# Patient Record
Sex: MALE | Employment: FULL TIME | URBAN - METROPOLITAN AREA
[De-identification: names, ages, dates, MRNs, and addresses within clinical notes are randomized per-mention and may not be internally consistent; named-entity substitution may affect disease eponyms.]

---

## 2021-04-05 ENCOUNTER — THERAPY VISIT (OUTPATIENT)
Dept: PHYSICAL THERAPY | Facility: CLINIC | Age: 36
End: 2021-04-05
Payer: COMMERCIAL

## 2021-04-05 DIAGNOSIS — M25.561 ACUTE PAIN OF RIGHT KNEE: ICD-10-CM

## 2021-04-05 PROCEDURE — 97110 THERAPEUTIC EXERCISES: CPT | Mod: GP | Performed by: PHYSICAL THERAPIST

## 2021-04-05 PROCEDURE — 97161 PT EVAL LOW COMPLEX 20 MIN: CPT | Mod: GP | Performed by: PHYSICAL THERAPIST

## 2021-04-05 ASSESSMENT — ACTIVITIES OF DAILY LIVING (ADL)
GIVING WAY, BUCKLING OR SHIFTING OF KNEE: I DO NOT HAVE THE SYMPTOM
AS_A_RESULT_OF_YOUR_KNEE_INJURY,_HOW_WOULD_YOU_RATE_YOUR_CURRENT_LEVEL_OF_DAILY_ACTIVITY?: ABNORMAL
WALK: ACTIVITY IS SOMEWHAT DIFFICULT
SQUAT: I AM UNABLE TO DO THE ACTIVITY
SWELLING: THE SYMPTOM AFFECTS MY ACTIVITY SLIGHTLY
KNEEL ON THE FRONT OF YOUR KNEE: I AM UNABLE TO DO THE ACTIVITY
RISE FROM A CHAIR: ACTIVITY IS SOMEWHAT DIFFICULT
LIMPING: THE SYMPTOM AFFECTS MY ACTIVITY SEVERELY
RAW_SCORE: 29
PAIN: THE SYMPTOM AFFECTS MY ACTIVITY MODERATELY
SIT WITH YOUR KNEE BENT: I AM UNABLE TO DO THE ACTIVITY
STAND: ACTIVITY IS MINIMALLY DIFFICULT
GO DOWN STAIRS: ACTIVITY IS FAIRLY DIFFICULT
KNEE_ACTIVITY_OF_DAILY_LIVING_SCORE: 41.43
STIFFNESS: THE SYMPTOM AFFECTS MY ACTIVITY SEVERELY
HOW_WOULD_YOU_RATE_THE_CURRENT_FUNCTION_OF_YOUR_KNEE_DURING_YOUR_USUAL_DAILY_ACTIVITIES_ON_A_SCALE_FROM_0_TO_100_WITH_100_BEING_YOUR_LEVEL_OF_KNEE_FUNCTION_PRIOR_TO_YOUR_INJURY_AND_0_BEING_THE_INABILITY_TO_PERFORM_ANY_OF_YOUR_USUAL_DAILY_ACTIVITIES?: 20
KNEE_ACTIVITY_OF_DAILY_LIVING_SUM: 29
WEAKNESS: THE SYMPTOM AFFECTS MY ACTIVITY SLIGHTLY
HOW_WOULD_YOU_RATE_THE_OVERALL_FUNCTION_OF_YOUR_KNEE_DURING_YOUR_USUAL_DAILY_ACTIVITIES?: ABNORMAL
GO UP STAIRS: ACTIVITY IS FAIRLY DIFFICULT

## 2021-04-05 NOTE — PROGRESS NOTES
Physical Therapy Initial Evaluation  Subjective:  The history is provided by the patient. No  was used.   Patient Health History  Braxton Fairbanks being seen for R Knee Strain.     Date of Onset: 3/14/21.   Problem occurred: Snowboard Fall   Pain score: ranges 0-8/10.  General health as reported by patient is good.  Pertinent medical history includes: high blood pressure. Other medical history details: Kidney disease.   Red flags:  None as reported by patient.  Medical allergies: none.    Other surgery history details: Abdominal Hernia.    Current medications:  Anti-inflammatory and high blood pressure medication.    Current occupation is HVAC TECH.   Primary job tasks include:  Computer work, lifting/carrying and prolonged sitting.                  Therapist Generated HPI Evaluation  Problem details: MRI 3/18/21:  IMPRESSION: Injury left/strain of the MCL; normal coursing fibers are identified.    Injury of the femoral attachment of the medial patellar retinaculum.    Bone contusion of the posterior most aspect of lateral femoral condyle.    Moderate size joint effusion.    Mild patellar tendon lateral femoral condyle impingement syndrome.    REPORT SIGNED BY DR. Mattie Hewitt      .         Type of problem:  Left knee.    This is a new condition.  Condition occurred with:  A fall/slip.  Where condition occurred: during recreation/sport (Snowboard).  Patient reports pain:  Medial.  Pain is described as sharp and is intermittent.  Pain is the same all the time.  Since onset symptoms are unchanged.  Associated symptoms:  Loss of motion/stiffness. Symptoms are exacerbated by bending/squatting, ascending stairs, descending stairs and kneeling (Bending Knee)  and relieved by rest, ice and NSAID's.  Special tests included:  MRI.    Restrictions due to condition include:  Working in normal job without restrictions.  Barriers include:  None as reported by patient.                         Objective:  Standing Alignment:                Ankle/Foot:  Pes planus L and pes planus R    Gait:    Gait Type:  Antalgic   Assistive Devices:  None    Non-Weight Bearing:        Knee:  Normal    Flexibility/Screens:   Positive screens:  Knee    Lower Extremity:      Decreased right lower extremity flexibility:  Quadriceps; Hamstrings; Gastroc and Soleus                                                      Knee Evaluation:  ROM:  Strength wnl knee: Fair Quad Set Right. Good strength w/o pain on resisted right knee ext and flexion.    PROM    Hyperextension: Left: 5   Right:   Extension: Left:   Right:  Lacking 5 degrees of extension   Flexion: Left: 140    Right:  65        Ligament Testing:    Varus 0:  Right:  Pos    Valgus 0:  Right:  Pos          Special Tests:       Right knee negative for the following special tests:  Meniscal and Patellar Compression  Palpation:      Right knee tenderness present at:  Medial Joint Line  Right knee tenderness not present at:  Lateral Joint Line; Patellar Tendon; Popliteal; Patellar Medial; Patellar Lateral; Patellar Superior and Patellar Inferior  Edema:  Normal    Mobility Testing:  Not Assessed            Functional Testing:  not assessed                  General     ROS    Assessment/Plan:    Patient is a 36 year old male with left side knee complaints.    Patient has the following significant findings with corresponding treatment plan.                Diagnosis 1:  L MCL Strain  Pain -  hot/cold therapy, manual therapy, self management, education and home program  Decreased ROM/flexibility - manual therapy, therapeutic exercise and home program  Decreased strength - therapeutic exercise, therapeutic activities and home program  Inflammation - cold therapy and self management/home program  Impaired gait - gait training and home program  Impaired muscle performance - neuro re-education and home program  Decreased function - therapeutic activities and home  program    Therapy Evaluation Codes:   1) History comprised of:   Personal factors that impact the plan of care:      None.    Comorbidity factors that impact the plan of care are:      High blood pressure and Kidney Disease.     Medications impacting care: Anti-inflammatory and High blood pressure.  2) Examination of Body Systems comprised of:   Body structures and functions that impact the plan of care:      Knee.   Activity limitations that impact the plan of care are:      Bending, Driving, Sports, Squatting/kneeling, Stairs and Walking.  3) Clinical presentation characteristics are:   Stable/Uncomplicated.  4) Decision-Making    Low complexity using standardized patient assessment instrument and/or measureable assessment of functional outcome.  Cumulative Therapy Evaluation is: Low complexity.    Previous and current functional limitations:  (See Goal Flow Sheet for this information)    Short term and Long term goals: (See Goal Flow Sheet for this information)     Communication ability:  Patient appears to be able to clearly communicate and understand verbal and written communication and follow directions correctly.  Treatment Explanation - The following has been discussed with the patient:   RX ordered/plan of care  Anticipated outcomes  Possible risks and side effects  This patient would benefit from PT intervention to resume normal activities.   Rehab potential is good.    Frequency:  2 X week, once daily  Duration:  for 3 weeks  Discharge Plan:  Achieve all LTG.  Independent in home treatment program.  Return to previous functional level by discharge.  Reach maximal therapeutic benefit.    Please refer to the daily flowsheet for treatment today, total treatment time and time spent performing 1:1 timed codes.

## 2021-04-07 ENCOUNTER — THERAPY VISIT (OUTPATIENT)
Dept: PHYSICAL THERAPY | Facility: CLINIC | Age: 36
End: 2021-04-07
Payer: COMMERCIAL

## 2021-04-07 DIAGNOSIS — M25.561 ACUTE PAIN OF RIGHT KNEE: ICD-10-CM

## 2021-04-07 PROCEDURE — 97110 THERAPEUTIC EXERCISES: CPT | Mod: GP | Performed by: PHYSICAL THERAPY ASSISTANT

## 2021-04-12 ENCOUNTER — THERAPY VISIT (OUTPATIENT)
Dept: PHYSICAL THERAPY | Facility: CLINIC | Age: 36
End: 2021-04-12
Payer: COMMERCIAL

## 2021-04-12 DIAGNOSIS — M25.561 ACUTE PAIN OF RIGHT KNEE: ICD-10-CM

## 2021-04-12 PROCEDURE — 97112 NEUROMUSCULAR REEDUCATION: CPT | Mod: GP | Performed by: PHYSICAL THERAPIST

## 2021-04-12 PROCEDURE — 97530 THERAPEUTIC ACTIVITIES: CPT | Mod: GP | Performed by: PHYSICAL THERAPIST

## 2021-04-12 PROCEDURE — 97110 THERAPEUTIC EXERCISES: CPT | Mod: GP | Performed by: PHYSICAL THERAPIST

## 2021-04-15 ENCOUNTER — THERAPY VISIT (OUTPATIENT)
Dept: PHYSICAL THERAPY | Facility: CLINIC | Age: 36
End: 2021-04-15
Payer: COMMERCIAL

## 2021-04-15 DIAGNOSIS — M25.561 ACUTE PAIN OF RIGHT KNEE: ICD-10-CM

## 2021-04-15 PROCEDURE — 97530 THERAPEUTIC ACTIVITIES: CPT | Mod: GP | Performed by: PHYSICAL THERAPIST

## 2021-04-15 PROCEDURE — 97112 NEUROMUSCULAR REEDUCATION: CPT | Mod: GP | Performed by: PHYSICAL THERAPIST

## 2021-04-15 PROCEDURE — 97110 THERAPEUTIC EXERCISES: CPT | Mod: GP | Performed by: PHYSICAL THERAPIST

## 2021-04-18 ENCOUNTER — HEALTH MAINTENANCE LETTER (OUTPATIENT)
Age: 36
End: 2021-04-18

## 2021-04-19 ENCOUNTER — THERAPY VISIT (OUTPATIENT)
Dept: PHYSICAL THERAPY | Facility: CLINIC | Age: 36
End: 2021-04-19
Payer: COMMERCIAL

## 2021-04-19 DIAGNOSIS — M25.561 ACUTE PAIN OF RIGHT KNEE: ICD-10-CM

## 2021-04-19 PROCEDURE — 97530 THERAPEUTIC ACTIVITIES: CPT | Mod: GP | Performed by: PHYSICAL THERAPIST

## 2021-04-19 PROCEDURE — 97110 THERAPEUTIC EXERCISES: CPT | Mod: GP | Performed by: PHYSICAL THERAPIST

## 2021-04-19 PROCEDURE — 97112 NEUROMUSCULAR REEDUCATION: CPT | Mod: GP | Performed by: PHYSICAL THERAPIST

## 2021-04-22 ENCOUNTER — THERAPY VISIT (OUTPATIENT)
Dept: PHYSICAL THERAPY | Facility: CLINIC | Age: 36
End: 2021-04-22
Payer: COMMERCIAL

## 2021-04-22 DIAGNOSIS — M25.561 ACUTE PAIN OF RIGHT KNEE: ICD-10-CM

## 2021-04-22 PROCEDURE — 97110 THERAPEUTIC EXERCISES: CPT | Mod: GP | Performed by: PHYSICAL THERAPIST

## 2021-04-22 PROCEDURE — 97112 NEUROMUSCULAR REEDUCATION: CPT | Mod: GP | Performed by: PHYSICAL THERAPIST

## 2021-04-22 PROCEDURE — 97530 THERAPEUTIC ACTIVITIES: CPT | Mod: GP | Performed by: PHYSICAL THERAPIST

## 2021-04-22 ASSESSMENT — ACTIVITIES OF DAILY LIVING (ADL)
SWELLING: I HAVE THE SYMPTOM BUT IT DOES NOT AFFECT MY ACTIVITY
KNEE_ACTIVITY_OF_DAILY_LIVING_SUM: 47
HOW_WOULD_YOU_RATE_THE_OVERALL_FUNCTION_OF_YOUR_KNEE_DURING_YOUR_USUAL_DAILY_ACTIVITIES?: NEARLY NORMAL
STAND: ACTIVITY IS NOT DIFFICULT
RAW_SCORE: 47
RISE FROM A CHAIR: ACTIVITY IS SOMEWHAT DIFFICULT
WALK: ACTIVITY IS MINIMALLY DIFFICULT
LIMPING: THE SYMPTOM AFFECTS MY ACTIVITY SLIGHTLY
STIFFNESS: THE SYMPTOM AFFECTS MY ACTIVITY MODERATELY
SQUAT: ACTIVITY IS SOMEWHAT DIFFICULT
GO UP STAIRS: ACTIVITY IS MINIMALLY DIFFICULT
PAIN: I HAVE THE SYMPTOM BUT IT DOES NOT AFFECT MY ACTIVITY
WEAKNESS: I HAVE THE SYMPTOM BUT IT DOES NOT AFFECT MY ACTIVITY
KNEE_ACTIVITY_OF_DAILY_LIVING_SCORE: 67.14
KNEEL ON THE FRONT OF YOUR KNEE: ACTIVITY IS VERY DIFFICULT
HOW_WOULD_YOU_RATE_THE_CURRENT_FUNCTION_OF_YOUR_KNEE_DURING_YOUR_USUAL_DAILY_ACTIVITIES_ON_A_SCALE_FROM_0_TO_100_WITH_100_BEING_YOUR_LEVEL_OF_KNEE_FUNCTION_PRIOR_TO_YOUR_INJURY_AND_0_BEING_THE_INABILITY_TO_PERFORM_ANY_OF_YOUR_USUAL_DAILY_ACTIVITIES?: 80
SIT WITH YOUR KNEE BENT: ACTIVITY IS SOMEWHAT DIFFICULT
GO DOWN STAIRS: ACTIVITY IS FAIRLY DIFFICULT
AS_A_RESULT_OF_YOUR_KNEE_INJURY,_HOW_WOULD_YOU_RATE_YOUR_CURRENT_LEVEL_OF_DAILY_ACTIVITY?: NEARLY NORMAL
GIVING WAY, BUCKLING OR SHIFTING OF KNEE: I DO NOT HAVE THE SYMPTOM

## 2021-04-22 NOTE — PROGRESS NOTES
Subjective:  HPI  Physical Exam       Knee Activity of Daily Living Score: 67.14            Objective:  System    Physical Exam    General     ROS    Assessment/Plan:    PROGRESS  REPORT    Progress reporting period is from 4-5-21 to 4-22-21.       SUBJECTIVE  Subjective changes noted by patient:  Braxton feels encouraged by his improvement each week. However, he does not yet have full confidence in the right knee. He is not yet able to kneel with ease. Cheif c/o stiffness and weakness.     Current Pain level: 0-1/10.     Initial Pain level: 6/10.   Changes in function:  Yes (See Goal flowsheet attached for changes in current functional level)  Adverse reaction to treatment or activity: None    OBJECTIVE  Changes noted in objective findings:  Yes,   Objective: Improved gait with verbal clues. R LE lags through follow-through. PROM = 0-0-132 degrees.      ASSESSMENT/PLAN  Updated problem list and treatment plan: Diagnosis 1:  R MCL Strain  Pain -  hot/cold therapy, manual therapy, self management, education and home program  Decreased ROM/flexibility - manual therapy, therapeutic exercise and home program  Decreased strength - therapeutic exercise, therapeutic activities and home program  Inflammation - self management/home program  Impaired gait - gait training and home program  Impaired muscle performance - neuro re-education and home program  Decreased function - therapeutic activities and home program  STG/LTGs have been met or progress has been made towards goals:  Yes (See Goal flow sheet completed today.)  Assessment of Progress: The patient's condition is improving.  Patient is meeting short term goals and is progressing towards long term goals.  Self Management Plans:  Patient has been instructed in a home treatment program.  Patient  has been instructed in self management of symptoms.  I have re-evaluated this patient and find that the nature, scope, duration and intensity of the therapy is appropriate for the  medical condition of the patient.  Braxton continues to require the following intervention to meet STG and LTG's:  PT    Recommendations:  This patient would benefit from continued therapy.     Frequency:  1 X week, once daily  Duration:  for 4 weeks        Please refer to the daily flowsheet for treatment today, total treatment time and time spent performing 1:1 timed codes.

## 2021-04-22 NOTE — LETTER
[facility]  [address]  [place] [address]  [phone]    2021  Re: Braxton Fairbanks   :   1985  MRN:  [id number]   REFERRING PHYSICIAN:   [facility]AC[facility]    Date of Initial Evaluation:  2021  Visits:  Rxs Used: 6  Reason for Referral:  Acute pain of right knee    Physical Exam       Knee Activity of Daily Living Score: 67.14            Assessment/Plan:    PROGRESS  REPORT  Progress reporting period is from 21 to 21.     SUBJECTIVE  Subjective changes noted by patient:  Braxton feels encouraged by his improvement each week. However, he does not yet have full confidence in the right knee. He is not yet able to kneel with ease. Cheif c/o stiffness and weakness.     Current Pain level: 0-1/10.     Initial Pain level: 6/10.   Changes in function:  Yes (See Goal flowsheet attached for changes in current functional level)  Adverse reaction to treatment or activity: None  OBJECTIVE  Changes noted in objective findings:  Yes,   Objective: Improved gait with verbal clues. R LE lags through follow-through. PROM = 0-0-132 degrees.    ASSESSMENT/PLAN  Updated problem list and treatment plan: Diagnosis 1:  R MCL Strain  Pain -  hot/cold therapy, manual therapy, self management, education and home program  Decreased ROM/flexibility - manual therapy, therapeutic exercise and home program  Decreased strength - therapeutic exercise, therapeutic activities and home program  Inflammation - self management/home program  Impaired gait - gait training and home program  Impaired muscle performance - neuro re-education and home program  Decreased function - therapeutic activities and home program  STG/LTGs have been met or progress has been made towards goals:  Yes (See Goal flow sheet completed today.)  Assessment of Progress: The patient's condition is improving.  Patient is meeting short term  goals and is progressing towards long term goals.  Self Management Plans:  Patient has been instructed in a home treatment program.  Patient  has been instructed in self management of symptoms.  I have re-evaluated this patient and find that the nature, scope, duration and intensity of the therapy is appropriate for the medical condition of the patient.  Braxton continues to require the following intervention to meet STG and LTG's:  PT  Re: Braxton FORD Magdi   :   1985, page 2    Recommendations:  This patient would benefit from continued therapy.     Frequency:  1 X week, once daily  Duration:  for 4 weeks        Thank you for your referral.    INQUIRIES  Therapist: DONNA Kim (Peg) [address]  [facility] [address]  Phone: [phone]  Fax: [phone]

## 2021-05-04 ENCOUNTER — THERAPY VISIT (OUTPATIENT)
Dept: PHYSICAL THERAPY | Facility: CLINIC | Age: 36
End: 2021-05-04
Payer: COMMERCIAL

## 2021-05-04 DIAGNOSIS — M25.561 ACUTE PAIN OF RIGHT KNEE: ICD-10-CM

## 2021-05-04 PROCEDURE — 97530 THERAPEUTIC ACTIVITIES: CPT | Mod: GP | Performed by: PHYSICAL THERAPIST

## 2021-05-04 PROCEDURE — 97110 THERAPEUTIC EXERCISES: CPT | Mod: GP | Performed by: PHYSICAL THERAPIST

## 2021-05-04 PROCEDURE — 97112 NEUROMUSCULAR REEDUCATION: CPT | Mod: GP | Performed by: PHYSICAL THERAPIST

## 2021-05-04 ASSESSMENT — ACTIVITIES OF DAILY LIVING (ADL)
SQUAT: ACTIVITY IS NOT DIFFICULT
GO DOWN STAIRS: ACTIVITY IS NOT DIFFICULT
STAND: ACTIVITY IS NOT DIFFICULT
GO UP STAIRS: ACTIVITY IS NOT DIFFICULT
SIT WITH YOUR KNEE BENT: ACTIVITY IS NOT DIFFICULT
KNEE_ACTIVITY_OF_DAILY_LIVING_SUM: 70
STIFFNESS: I DO NOT HAVE THE SYMPTOM
KNEEL ON THE FRONT OF YOUR KNEE: ACTIVITY IS NOT DIFFICULT
AS_A_RESULT_OF_YOUR_KNEE_INJURY,_HOW_WOULD_YOU_RATE_YOUR_CURRENT_LEVEL_OF_DAILY_ACTIVITY?: NEARLY NORMAL
LIMPING: I DO NOT HAVE THE SYMPTOM
HOW_WOULD_YOU_RATE_THE_OVERALL_FUNCTION_OF_YOUR_KNEE_DURING_YOUR_USUAL_DAILY_ACTIVITIES?: NEARLY NORMAL
WEAKNESS: I DO NOT HAVE THE SYMPTOM
HOW_WOULD_YOU_RATE_THE_CURRENT_FUNCTION_OF_YOUR_KNEE_DURING_YOUR_USUAL_DAILY_ACTIVITIES_ON_A_SCALE_FROM_0_TO_100_WITH_100_BEING_YOUR_LEVEL_OF_KNEE_FUNCTION_PRIOR_TO_YOUR_INJURY_AND_0_BEING_THE_INABILITY_TO_PERFORM_ANY_OF_YOUR_USUAL_DAILY_ACTIVITIES?: 95
SWELLING: I DO NOT HAVE THE SYMPTOM
RISE FROM A CHAIR: ACTIVITY IS NOT DIFFICULT
KNEE_ACTIVITY_OF_DAILY_LIVING_SCORE: 100
GIVING WAY, BUCKLING OR SHIFTING OF KNEE: I DO NOT HAVE THE SYMPTOM
PAIN: I DO NOT HAVE THE SYMPTOM
RAW_SCORE: 70
WALK: ACTIVITY IS NOT DIFFICULT

## 2021-05-04 NOTE — LETTER
[facility]  [address]  [facility] [address] [phone]    May 5, 2021    Re: Braxton Fairbanks   :   1985  MRN:  [id number]   REFERRING PHYSICIAN:   Migel FORD [facility]    Date of Initial Evaluation:  2021  Visits:  Rxs Used: 7  Reason for Referral:  Acute pain of right knee    EVALUATION SUMMARY    Subjective:  HPI  Physical Exam       Knee Activity of Daily Living Score: 100            Objective:  System    Physical Exam    General     ROS    Assessment/Plan:    DISCHARGE REPORT    Progress reporting period is from 21 to 21.       SUBJECTIVE  Subjective changes noted by patient:       Braxton's cheif c/o mild stiffness in AM. Would like to resume his previous level of activity.    Current Pain level: 0/10.     Initial Pain level: 6/10.   Changes in function:  Yes (See Goal flowsheet attached for changes in current functional level)  Adverse reaction to treatment or activity: None    OBJECTIVE  Changes noted in objective findings:       Passive Right Knee ROM = 3-0-140 degrees. Ambulates on level surfaces and stairs in normal gait pattern w/o pain.          Re: Braxton Fairbanks   :   1985    ASSESSMENT/PLAN  Updated problem list and treatment plan: Diagnosis 1:  R MCL Strain  Pain -  hot/cold therapy, self management, education and home program  Decreased ROM/flexibility - manual therapy, therapeutic exercise and home program  Decreased strength - therapeutic exercise, therapeutic activities and home program  Inflammation - self management/home program  Impaired gait - home program  Impaired muscle performance - neuro re-education and home program  STG/LTGs have been met or progress has been made towards goals:  Yes (See Goal flow sheet completed today.)  Assessment of Progress: The patient's condition is improving.  The patient has met all of their long term goals.  Self Management  Plans:  Patient is independent in a home treatment program.  Patient is independent in self management of symptoms.  I have re-evaluated this patient and find that the nature, scope, duration and intensity of the therapy is appropriate for the medical condition of the patient.  Braxton continues to require the following intervention to meet STG and LTG's:  PT intervention is no longer required to meet STG/LTG.    Recommendations:  This patient is ready to be discharged from therapy and continue their home treatment program.    Thank you for your referral.    INQUIRIES  Therapist: Janiya Bolton PT (Peg)  [address]  [facility] [address]  Phone: [phone]  Fax: [phone]

## 2021-05-04 NOTE — PROGRESS NOTES
Subjective:  HPI  Physical Exam       Knee Activity of Daily Living Score: 100            Objective:  System    Physical Exam    General     ROS    Assessment/Plan:    DISCHARGE REPORT    Progress reporting period is from 4-5-21 to 5-4-21.       SUBJECTIVE  Subjective changes noted by patient:       Braxton's cheif c/o mild stiffness in AM. Would like to resume his previous level of activity.    Current Pain level: 0/10.     Initial Pain level: 6/10.   Changes in function:  Yes (See Goal flowsheet attached for changes in current functional level)  Adverse reaction to treatment or activity: None    OBJECTIVE  Changes noted in objective findings:       Passive Right Knee ROM = 3-0-140 degrees. Ambulates on level surfaces and stairs in normal gait pattern w/o pain.      ASSESSMENT/PLAN  Updated problem list and treatment plan: Diagnosis 1:  R MCL Strain  Pain -  hot/cold therapy, self management, education and home program  Decreased ROM/flexibility - manual therapy, therapeutic exercise and home program  Decreased strength - therapeutic exercise, therapeutic activities and home program  Inflammation - self management/home program  Impaired gait - home program  Impaired muscle performance - neuro re-education and home program  STG/LTGs have been met or progress has been made towards goals:  Yes (See Goal flow sheet completed today.)  Assessment of Progress: The patient's condition is improving.  The patient has met all of their long term goals.  Self Management Plans:  Patient is independent in a home treatment program.  Patient is independent in self management of symptoms.  I have re-evaluated this patient and find that the nature, scope, duration and intensity of the therapy is appropriate for the medical condition of the patient.  Braxton continues to require the following intervention to meet STG and LTG's:  PT intervention is no longer required to meet STG/LTG.    Recommendations:  This patient is ready to be  discharged from therapy and continue their home treatment program.    Please refer to the daily flowsheet for treatment today, total treatment time and time spent performing 1:1 timed codes.

## 2021-10-03 ENCOUNTER — HEALTH MAINTENANCE LETTER (OUTPATIENT)
Age: 36
End: 2021-10-03

## 2022-05-15 ENCOUNTER — HEALTH MAINTENANCE LETTER (OUTPATIENT)
Age: 37
End: 2022-05-15

## 2022-05-16 ENCOUNTER — PATIENT OUTREACH (OUTPATIENT)
Dept: ONCOLOGY | Facility: CLINIC | Age: 37
End: 2022-05-16
Payer: COMMERCIAL

## 2022-05-16 ENCOUNTER — DOCUMENTATION ONLY (OUTPATIENT)
Dept: ONCOLOGY | Facility: CLINIC | Age: 37
End: 2022-05-16
Payer: COMMERCIAL

## 2022-05-16 ENCOUNTER — TRANSCRIBE ORDERS (OUTPATIENT)
Dept: ONCOLOGY | Facility: CLINIC | Age: 37
End: 2022-05-16
Payer: COMMERCIAL

## 2022-05-16 DIAGNOSIS — C71.9 GBM (GLIOBLASTOMA MULTIFORME) (H): Primary | ICD-10-CM

## 2022-05-16 NOTE — PROGRESS NOTES
I received a referral for this patient to be seen by medical oncology.  I called his sister, Floridalma Fairbanks, explained the purpose of my call, explained my role and worked to find an appointment that worked for them.  Scheduling instructions given and referral sent to NPS (new patient scheduling) team to be finalized.  Questions answered.  My contact information was shared with patient and explained to them that I am their contact until they meet with medical oncology and then their Oncologist's RNCC (RN Care Coordinator) will be their contact.    New Patient Oncology Nurse Navigator Note     Referring provider: self referral     Referred to (specialty): Neuro Medical Oncology    Requested provider (if applicable): Dr. Fely Andrea     Date Referral Received: 5/16/2022     Evaluation for : GBM     Clinical History (per Nurse review of records provided):    **BOOK MARKED**  NOTES:  5/9/2022:  [facility]  5/6/2022:  Admission to [facility]  5/6/2022:  [facility] ED visit    IMAGING:  **multiple images at [facility]**      PATHOLOGY:  5/9/2022  Amendment  This report is amended to add the result of BRAF V600E immunohistochemistry: it is negative.     Although IDH-mutant gliomas show nearly universal MGMT promoter methylation, certain clinical trials require MGMT promoter methylation testing for enrollment; following discussion of this case with Dr. Stephen, this testing is forwarded. Results will be reported by further amendment.   Final Diagnosis  A) BRAIN, LEFT FRONTAL TUMOR, RESECTION:   1. 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 IDH1 R132H immunostain positive   3. ATRX immunostain with lost immunoreactivity   4. Immunostain for p53 strongly positive    Amendment electronically signed by Siddhartha Jacobs MD on 5/12/2022 at  8:31 AM Electronically signed by Siddhartha Jacobs MD on 5/10/2022 at  4:47 PM   Comment  This infiltrating  "astrocytoma would have been classified as glioblastoma, IDH-mutant in the 2016 WHO classification (1). The term \"astrocytoma, IDH-mutant, grade 4\" reflects the observation that these tumors tend to behave somewhat better than IDH-wildtype glioblastomas (2), although both are retained in the CNS WHO grade 4            Clinical Assessment / Barriers to Care (Per Nurse): Getting opinions at [facility]       Records Location (Care Everywhere, Media, etc.): [facility] ([place], [place])     Records Needed: imaging PUSHED FROM [facility]     Additional testing needed prior to consult: none      " Alert and oriented to person, place and time

## 2022-05-16 NOTE — PROGRESS NOTES
Action May 16, 2022 11:36 AM ABT   Action Taken Called [facility] Film Room and spoke to Benjamin, confirms that both MR Head/Brain from 05/8/22 & 05/09/22 will be pushed to PACS.    Patient's sister gave auth to LUPILLO aD Silva to pull [facility] recs.    Recs updated in [facility]. Called and spoke to [place] at [facility]ology and request for 05/06/22 MR Brain and CT Head. Mali confirms images will be pushed to PACS    11:52 AM ABT  Images from [facility] and [facility] received and resolved to PACS

## 2022-05-16 NOTE — PROGRESS NOTES
RECORDS STATUS - ALL OTHER DIAGNOSIS      RECORDS RECEIVED FROM: [facility]   DATE RECEIVED: 5/17/2022   NOTES STATUS DETAILS   OFFICE NOTE from referring provider Self  Self Referral    DISCHARGE SUMMARY from hospital     DISCHARGE REPORT from the ER     OPERATIVE REPORT Complete See Brain Biopsy in [facility]   CLINICAL TRIAL TREATMENTS TO DATE     LABS     PATHOLOGY REPORTS Requested- [facility]ach[facility] Tracking Number:  [id number] 5/9/2022 in    Case: [id number]  A) BRAIN, LEFT FRONTAL TUMOR, RESECTION:   1. 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 IDH1 R132H immunostain positive   3. ATRX immunostain with lost immunoreactivity   4. Immunostain for p53 strongly positive    ANYTHING RELATED TO DIAGNOSIS Complete Labs last updated on 5/12/2022 in    GENONOMIC TESTING     TYPE:     IMAGING (NEED IMAGES & REPORT)     CT SCANS Complete CT Head 5/6/2022   MRI Complete MRI Brain 5/9/2022, 5/8/2022, 5/6/2022   MAMMO     ULTRASOUND     PET

## 2022-05-17 ENCOUNTER — PRE VISIT (OUTPATIENT)
Dept: ONCOLOGY | Facility: CLINIC | Age: 37
End: 2022-05-17

## 2022-05-17 ENCOUNTER — ONCOLOGY VISIT (OUTPATIENT)
Dept: ONCOLOGY | Facility: CLINIC | Age: 37
End: 2022-05-17
Attending: PSYCHIATRY & NEUROLOGY
Payer: COMMERCIAL

## 2022-05-17 ENCOUNTER — PATIENT OUTREACH (OUTPATIENT)
Dept: ONCOLOGY | Facility: CLINIC | Age: 37
End: 2022-05-17
Payer: COMMERCIAL

## 2022-05-17 VITALS
SYSTOLIC BLOOD PRESSURE: 148 MMHG | HEART RATE: 79 BPM | HEIGHT: 70 IN | WEIGHT: 213 LBS | BODY MASS INDEX: 30.49 KG/M2 | OXYGEN SATURATION: 98 % | TEMPERATURE: 98 F | RESPIRATION RATE: 20 BRPM | DIASTOLIC BLOOD PRESSURE: 89 MMHG

## 2022-05-17 DIAGNOSIS — C71.9 GRADE 4 ASTROCYTOMA (H): Primary | ICD-10-CM

## 2022-05-17 PROCEDURE — 99205 OFFICE O/P NEW HI 60 MIN: CPT | Performed by: PSYCHIATRY & NEUROLOGY

## 2022-05-17 RX ORDER — LEVETIRACETAM 500 MG/1
500 TABLET ORAL
COMMUNITY
Start: 2022-05-12

## 2022-05-17 RX ORDER — LISINOPRIL 10 MG/1
10 TABLET ORAL
COMMUNITY
Start: 2022-05-12

## 2022-05-17 RX ORDER — DEXAMETHASONE 2 MG/1
TABLET ORAL
COMMUNITY
Start: 2022-05-12 | End: 2022-05-22

## 2022-05-17 RX ORDER — ACETAMINOPHEN 325 MG/1
325-650 TABLET ORAL
COMMUNITY
Start: 2022-05-12

## 2022-05-17 ASSESSMENT — PAIN SCALES - GENERAL: PAINLEVEL: NO PAIN (0)

## 2022-05-17 NOTE — PROGRESS NOTES
"Oncology Rooming Note    May 17, 2022 8:08 AM   Braxton Fairbanks is a 37 year old male who presents for:    Chief Complaint   Patient presents with     Oncology Clinic Visit     Initial Vitals: BP (!) 148/89 (BP Location: Right arm, Patient Position: Sitting, Cuff Size: Adult Regular)   Pulse 79   Temp 98  F (36.7  C) (Oral)   Resp 20   Ht 1.77 m (5' 9.69\")   Wt 96.6 kg (213 lb)   SpO2 98%   BMI 30.84 kg/m   Estimated body mass index is 30.84 kg/m  as calculated from the following:    Height as of this encounter: 1.77 m (5' 9.69\").    Weight as of this encounter: 96.6 kg (213 lb). Body surface area is 2.18 meters squared.  No Pain (0) Comment: Data Unavailable   No LMP for male patient.  Allergies reviewed: Yes  Medications reviewed: Yes    Medications: Medication refills not needed today.  Pharmacy name entered into [facility]: [facility] [address] [address] [facility]    Clinical concerns: no     Patient with Father ilda and sister Katt Mckeonjesus Balderas, [facility]              "

## 2022-05-17 NOTE — LETTER
5/17/2022         RE: Braxton Fairbanks  [id number] [address] [place] [id number]        Dear Colleague,    Thank you for referring your patient, Braxton Fairbanks, to the [facility]. Please see a copy of my visit note below.    NEURO-ONCOLOGY INITIAL VISIT  May 17, 2022    CHIEF COMPLAINT: Mr. 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 He is presenting to this initial clinic visit for evaluation and recommendations on treatment.     Accompanying him to this visit is Jos (father) and Floridalma (sister).    HISTORY OF PRESENT ILLNESS  A summary of the patient s oncologic history is as follows;   -5/2022 PRESENTATION: New onset, severe headaches.   -5/6/2022 MR brain imaging with a left frontal mass measuring 84 x 54 x 55 mm with a large cystic component posteriorly and a heterogeneous enhancing component anteriorly. The heterogeneously enhancing portion involves the cortex of the left frontal lobe and appears to be some associated thickening of the left frontal cortex. There also appears to be focal thickening of the left frontal bone in this location suggestive of relatively slow growth. There is mild surrounding edema and mass effect with left-to-right midline shift and early hydrocephalus.   -5/9/2022 SURGERY: Left frontal craniotomy for mass resection by Dr. Milan Mustafa.  PATHOLOGY: WHO grade 4 astrocytoma; IDH1-R132H mutated.   BRAF V600E immunohistochemistry negative.  ATRX immunostain with lost immunoreactivity. Immunostain for p53 strongly positive.  Post-operative imaging from 5/10 with an interval gross total resection of enhancing tumor components within the anterior left frontal lobe. Surgical decompression of the dominant left frontal tumoral cyst. Improved mass effect on the lateral ventricles and decreased rightward midline shift. No concerning  "diffusion imaging abnormalities.   -5/17/2022 NEURO-ONC: Recommending chemoradiotherapy per standard protocol.     Today in clinic;   -Braxton is doing well and has recovered well from surgery.  -Headaches resolved.   -Denies any weakness, changes in sensation, abnormalities with vision, impairment in cognitive ability, or difficulty with word-finding.  -Denies any episodes concerning for a seizure, including unexplained episodes of loss of consciousness, unexplained falls, unexplained loss of bowel/ bladder control or tongue biting, unexplained bruising/ myalgia, periods of loss of time, or witnessed shaking/ jerking movements.   -Tolerating dexamethasone taper; noting insomnia with nighttime dosing.   -Fertility preservation was discussed and Braxton will consider this.       MEDICATIONS   Current Outpatient Medications   Medication Sig Dispense Refill     acetaminophen (TYLENOL) 325 MG tablet Take 325-650 mg by mouth       dexamethasone (DECADRON) 2 MG tablet        levETIRAcetam (KEPPRA) 500 MG tablet Take 500 mg by mouth       lisinopril (ZESTRIL) 10 MG tablet Take 10 mg by mouth       DRUG ALLERGIES   Allergies   Allergen Reactions     Seasonal Allergies      IMMUNIZATIONS   Immunization History   Administered Date(s) Administered     TDAP Vaccine (Adacel) 06/05/2013       SOCIAL HISTORY   History   Smoking Status     Former Smoker     Quit date: 5/16/2020   Smokeless Tobacco     Former User      History   Drug Use Not on file   Employment: Working in maintenance, 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       PHYSICAL EXAMINATION  BP (!) 148/89 (BP Location: Right arm, Patient Position: Sitting, Cuff Size: Adult Regular)   Pulse 79   Temp 98  F (36.7  C) (Oral)   Resp 20   Ht 1.77 m (5' 9.69\")   Wt 96.6 kg (213 lb)   SpO2 98%   BMI 30.84 kg/m     Wt Readings from Last 2 Encounters:   05/17/22 96.6 kg (213 lb)   06/05/13 79.4 kg (175 lb)      Ht Readings from Last 2 Encounters:   05/17/22 1.77 m (5' 9.69\")   06/05/13 " "1.765 m (5' 9.49\")     KPS: 100    -Generally well appearing.  -Respiratory: Normal breath sounds, no audible wheezing.   -Skin: No rashes. Healing head incision; suture in place.  -Hematologic/ lymphatic: No leg swelling.  -Psychiatric: Normal mood and affect. Pleasant, talkative.  -Neurologic:   MENTAL STATUS:     Alert, oriented to date.    Recall: Intact.    Speech fluent.    Comprehension intact to multi-step commands.   Good right-left orientation.     CRANIAL NERVES:     Pupils are equal, round.     Extraocular movements full, denies diplopia.     Visual fields full; difficulty in right lower quadrant.   Facial sensation intact to light touch.   Symmetric facial movements; flattening of nasolabial fold on the right.   Hearing intact.   No dysarthria.  MOTOR:    No pronation or drift.   Able to rise from a chair without use of arms.   On toe/ heel walk, questionable decrease in distance from floor to heels/ toes on the right.   SENSATION: Intact to light touch throughout.  COORDINATION: Intact finger-nose with eyes open and closed.    GAIT:  Walks without assistance.   Good speed. Normal stride length and heel strike. Normal turns. Normal arm swing.   Able to toe, heel walk. Able to tandem walk.       MEDICAL RECORDS  Obtained and personally reviewed all available outside medical records in addition to reviewing any records available in our electronic system.     LABS  Personally reviewed all available lab results; CBC, CMP.     IMAGING  Personally reviewed pre- and post-operative MR brain imaging. To my eye, there was a gross total resection of the left frontal mass.        IMPRESSION  Clinic time for this high complexity encounter was spent discussing in detail the nature of his cancer, answering questions pertaining to my recommendations, and devising the treatment plan as outlined below.     Today, I had the opportunity to discuss with Braxton and his family his WHO grade 4 pathology results plus to the IDH " mutated status and its impact on prognosis. Of note, given the cancer's IDH mutated status, he is excluded from the 3 upfront clinical trials that I have open. MGMT promoter methylation status is pending. We also reviewed his pre- and post-operative imaging that demonstrated an impressive gross total resection. Unfortunately, we did too discuss that he has been diagnosed with a type of brain cancer for which there is currently no cure. Therefore, cancer-directed treatment strives to slow further growth and increase the time interval to recurrence.    With regard to cancer-directed therapy, a multimodal treatment approach first involves attempting a maximum safe surgical resection. In this case, a gross total resection was performed. Following surgery, standard of care for glioblastoma is chemoradiotherapy with temozolomide dosed at 75 mg/m2 daily concomitantly with radiation therapy. Risks/ benefits of temozolomide were reviewed and the following common, anticipated side effects of this treatment were discussed today including, but not limited to, fatigue, nausea, and constipation. Any AST/ ALT elevations can be seen and can be exacerbated by the routine use of alcohol. Concomitant radiation can result in increased cerebral edema and therefore, symptoms of malaise and fatigue generally worsen, but do eventually improve. The combination therapy can result in bone marrow suppression; leukopenia and thrombocytopenia.    I discussed the risk/ benefits of fertility preservation given the planned use of chemotherapy and Braxton is to consider this.      PROBLEM LIST  Grade 4 astrocytoma    PLAN  -CANCER-DIRECTED THERAPY-  -Braxton to determine where he would like to have this standard treatment.     -STEROIDS-  -Continue dexamethasone taper; recommended dosing steroids at 1 time in the AM.    -SEIZURE MANAGEMENT-  -While this patient is at increased risk of having seizures, given the lack of seizure history, there is no  "indication to prescribe an antiepileptic at this time.   -Recommended stopping Keppra once 2 weeks out from surgery.     -MOOD-  -For low mood, can consider seeing a therapist. [facility] referral can be made to Dr. Hung Elmore.  -Consideration for adding an ssri if needed to help with mood.   -Provided information about the [facility] [place] [facility].     Return to clinic as needed.     Fely Andrea MD  Neuro-oncology       Oncology Rooming Note    May 17, 2022 8:08 AM   Braxton Fairbanks is a 37 year old male who presents for:    Chief Complaint   Patient presents with     Oncology Clinic Visit     Initial Vitals: BP (!) 148/89 (BP Location: Right arm, Patient Position: Sitting, Cuff Size: Adult Regular)   Pulse 79   Temp 98  F (36.7  C) (Oral)   Resp 20   Ht 1.77 m (5' 9.69\")   Wt 96.6 kg (213 lb)   SpO2 98%   BMI 30.84 kg/m   Estimated body mass index is 30.84 kg/m  as calculated from the following:    Height as of this encounter: 1.77 m (5' 9.69\").    Weight as of this encounter: 96.6 kg (213 lb). Body surface area is 2.18 meters squared.  No Pain (0) Comment: Data Unavailable   No LMP for male patient.  Allergies reviewed: Yes  Medications reviewed: Yes    Medications: Medication refills not needed today.  Pharmacy name entered into [employer]: [facility] [address] [place], [place] - [address] [facility]    Clinical concerns: no     Patient with Father ilda and sister Katt    Roxane Balderas, [facility]                  Again, thank you for allowing me to participate in the care of your patient.        Sincerely,        Fely Andrea MD    "

## 2022-05-17 NOTE — PROGRESS NOTES
NEURO-ONCOLOGY INITIAL VISIT  May 17, 2022    CHIEF COMPLAINT: Mr. 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 He is presenting to this initial clinic visit for evaluation and recommendations on treatment.     Accompanying him to this visit is Jos (father) and Floridalma (sister).    HISTORY OF PRESENT ILLNESS  A summary of the patient s oncologic history is as follows;   -5/2022 PRESENTATION: New onset, severe headaches.   -5/6/2022 MR brain imaging with a left frontal mass measuring 84 x 54 x 55 mm with a large cystic component posteriorly and a heterogeneous enhancing component anteriorly. The heterogeneously enhancing portion involves the cortex of the left frontal lobe and appears to be some associated thickening of the left frontal cortex. There also appears to be focal thickening of the left frontal bone in this location suggestive of relatively slow growth. There is mild surrounding edema and mass effect with left-to-right midline shift and early hydrocephalus.   -5/9/2022 SURGERY: Left frontal craniotomy for mass resection by Dr. Milan Mustafa.  PATHOLOGY: WHO grade 4 astrocytoma; IDH1-R132H mutated.   BRAF V600E immunohistochemistry negative.  ATRX immunostain with lost immunoreactivity. Immunostain for p53 strongly positive.  Post-operative imaging from 5/10 with an interval gross total resection of enhancing tumor components within the anterior left frontal lobe. Surgical decompression of the dominant left frontal tumoral cyst. Improved mass effect on the lateral ventricles and decreased rightward midline shift. No concerning diffusion imaging abnormalities.   -5/17/2022 NEURO-ONC: Recommending chemoradiotherapy per standard protocol.     Today in clinic;   -Braxton is doing well and has recovered well from surgery.  -Headaches resolved.   -Denies any weakness, changes in  "sensation, abnormalities with vision, impairment in cognitive ability, or difficulty with word-finding.  -Denies any episodes concerning for a seizure, including unexplained episodes of loss of consciousness, unexplained falls, unexplained loss of bowel/ bladder control or tongue biting, unexplained bruising/ myalgia, periods of loss of time, or witnessed shaking/ jerking movements.   -Tolerating dexamethasone taper; noting insomnia with nighttime dosing.   -Fertility preservation was discussed and Braxton will consider this.       MEDICATIONS   Current Outpatient Medications   Medication Sig Dispense Refill     acetaminophen (TYLENOL) 325 MG tablet Take 325-650 mg by mouth       dexamethasone (DECADRON) 2 MG tablet        levETIRAcetam (KEPPRA) 500 MG tablet Take 500 mg by mouth       lisinopril (ZESTRIL) 10 MG tablet Take 10 mg by mouth       DRUG ALLERGIES   Allergies   Allergen Reactions     Seasonal Allergies      IMMUNIZATIONS   Immunization History   Administered Date(s) Administered     TDAP Vaccine (Adacel) 06/05/2013       SOCIAL HISTORY   History   Smoking Status     Former Smoker     Quit date: 5/16/2020   Smokeless Tobacco     Former User      History   Drug Use Not on file   Employment: Working in maintenance, 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       PHYSICAL EXAMINATION  BP (!) 148/89 (BP Location: Right arm, Patient Position: Sitting, Cuff Size: Adult Regular)   Pulse 79   Temp 98  F (36.7  C) (Oral)   Resp 20   Ht 1.77 m (5' 9.69\")   Wt 96.6 kg (213 lb)   SpO2 98%   BMI 30.84 kg/m     Wt Readings from Last 2 Encounters:   05/17/22 96.6 kg (213 lb)   06/05/13 79.4 kg (175 lb)      Ht Readings from Last 2 Encounters:   05/17/22 1.77 m (5' 9.69\")   06/05/13 1.765 m (5' 9.49\")     KPS: 100    -Generally well appearing.  -Respiratory: Normal breath sounds, no audible wheezing.   -Skin: No rashes. Healing head incision; suture in place.  -Hematologic/ lymphatic: No leg swelling.  -Psychiatric: Normal mood " and affect. Pleasant, talkative.  -Neurologic:   MENTAL STATUS:     Alert, oriented to date.    Recall: Intact.    Speech fluent.    Comprehension intact to multi-step commands.   Good right-left orientation.     CRANIAL NERVES:     Pupils are equal, round.     Extraocular movements full, denies diplopia.     Visual fields full; difficulty in right lower quadrant.   Facial sensation intact to light touch.   Symmetric facial movements; flattening of nasolabial fold on the right.   Hearing intact.   No dysarthria.  MOTOR:    No pronation or drift.   Able to rise from a chair without use of arms.   On toe/ heel walk, questionable decrease in distance from floor to heels/ toes on the right.   SENSATION: Intact to light touch throughout.  COORDINATION: Intact finger-nose with eyes open and closed.    GAIT:  Walks without assistance.   Good speed. Normal stride length and heel strike. Normal turns. Normal arm swing.   Able to toe, heel walk. Able to tandem walk.       MEDICAL RECORDS  Obtained and personally reviewed all available outside medical records in addition to reviewing any records available in our electronic system.     LABS  Personally reviewed all available lab results; CBC, CMP.     IMAGING  Personally reviewed pre- and post-operative MR brain imaging. To my eye, there was a gross total resection of the left frontal mass.        IMPRESSION  Clinic time for this high complexity encounter was spent discussing in detail the nature of his cancer, answering questions pertaining to my recommendations, and devising the treatment plan as outlined below.     Today, I had the opportunity to discuss with Braxton and his family his WHO grade 4 pathology results plus to the IDH mutated status and its impact on prognosis. Of note, given the cancer's IDH mutated status, he is excluded from the 3 upfront clinical trials that I have open. MGMT promoter methylation status is pending. We also reviewed his pre- and post-operative  imaging that demonstrated an impressive gross total resection. Unfortunately, we did too discuss that he has been diagnosed with a type of brain cancer for which there is currently no cure. Therefore, cancer-directed treatment strives to slow further growth and increase the time interval to recurrence.    With regard to cancer-directed therapy, a multimodal treatment approach first involves attempting a maximum safe surgical resection. In this case, a gross total resection was performed. Following surgery, standard of care for glioblastoma is chemoradiotherapy with temozolomide dosed at 75 mg/m2 daily concomitantly with radiation therapy. Risks/ benefits of temozolomide were reviewed and the following common, anticipated side effects of this treatment were discussed today including, but not limited to, fatigue, nausea, and constipation. Any AST/ ALT elevations can be seen and can be exacerbated by the routine use of alcohol. Concomitant radiation can result in increased cerebral edema and therefore, symptoms of malaise and fatigue generally worsen, but do eventually improve. The combination therapy can result in bone marrow suppression; leukopenia and thrombocytopenia.    I discussed the risk/ benefits of fertility preservation given the planned use of chemotherapy and Braxton is to consider this.      PROBLEM LIST  Grade 4 astrocytoma    PLAN  -CANCER-DIRECTED THERAPY-  -Braxton to determine where he would like to have this standard treatment.     -STEROIDS-  -Continue dexamethasone taper; recommended dosing steroids at 1 time in the AM.    -SEIZURE MANAGEMENT-  -While this patient is at increased risk of having seizures, given the lack of seizure history, there is no indication to prescribe an antiepileptic at this time.   -Recommended stopping Keppra once 2 weeks out from surgery.     -MOOD-  -For low mood, can consider seeing a therapist. [facility] referral can be made to Dr. Hung Elmore.  -Consideration  for adding an ssri if needed to help with mood.   -Provided information about the [facility] [place] [facility].     Return to clinic as needed.     Fely Andrea MD  Neuro-oncology

## 2022-05-17 NOTE — PROGRESS NOTES
Pt in today with his sister and Dad. Introduced pt and family to clinic coordinator services. Sent email to brain tumor support person. Will wait to hear from pt if he wants to pursue care with Dr. Andrea.       Cat Vázquez, RN, BSN  Specialty Care Coordinator  [facility]  [phone]

## 2022-06-01 NOTE — PROGRESS NOTES
Pathology slides arrived from [facility] - taken to 5th floor path lab

## 2022-06-02 ENCOUNTER — LAB (OUTPATIENT)
Dept: LAB | Facility: CLINIC | Age: 37
End: 2022-06-02
Payer: COMMERCIAL

## 2022-06-02 DIAGNOSIS — C71.9 GBM (GLIOBLASTOMA MULTIFORME) (H): Primary | ICD-10-CM

## 2022-06-02 LAB
PATH REPORT.COMMENTS IMP SPEC: NORMAL
PATH REPORT.COMMENTS IMP SPEC: NORMAL
PATH REPORT.FINAL DX SPEC: NORMAL
PATH REPORT.GROSS SPEC: NORMAL
PATH REPORT.MICROSCOPIC SPEC OTHER STN: NORMAL
PATH REPORT.RELEVANT HX SPEC: NORMAL
PATH REPORT.RELEVANT HX SPEC: NORMAL
PATH REPORT.SITE OF ORIGIN SPEC: NORMAL

## 2022-06-02 PROCEDURE — 88321 CONSLTJ&REPRT SLD PREP ELSWR: CPT | Performed by: SPECIALIST

## 2022-09-04 ENCOUNTER — HEALTH MAINTENANCE LETTER (OUTPATIENT)
Age: 37
End: 2022-09-04

## 2023-06-03 ENCOUNTER — HEALTH MAINTENANCE LETTER (OUTPATIENT)
Age: 38
End: 2023-06-03

## 2024-07-07 ENCOUNTER — HEALTH MAINTENANCE LETTER (OUTPATIENT)
Age: 39
End: 2024-07-07